# Patient Record
Sex: FEMALE | Race: WHITE | NOT HISPANIC OR LATINO | Employment: FULL TIME | URBAN - METROPOLITAN AREA
[De-identification: names, ages, dates, MRNs, and addresses within clinical notes are randomized per-mention and may not be internally consistent; named-entity substitution may affect disease eponyms.]

---

## 2019-07-31 ENCOUNTER — OFFICE VISIT (OUTPATIENT)
Dept: ENDOCRINOLOGY | Facility: CLINIC | Age: 39
End: 2019-07-31

## 2019-07-31 VITALS
SYSTOLIC BLOOD PRESSURE: 98 MMHG | WEIGHT: 156.5 LBS | OXYGEN SATURATION: 99 % | HEART RATE: 57 BPM | BODY MASS INDEX: 25.15 KG/M2 | DIASTOLIC BLOOD PRESSURE: 68 MMHG | HEIGHT: 66 IN

## 2019-07-31 DIAGNOSIS — E55.9 VITAMIN D DEFICIENCY: ICD-10-CM

## 2019-07-31 DIAGNOSIS — E53.8 B12 DEFICIENCY: ICD-10-CM

## 2019-07-31 DIAGNOSIS — E06.3 HASHIMOTO'S DISEASE: Primary | ICD-10-CM

## 2019-07-31 PROCEDURE — 99243 OFF/OP CNSLTJ NEW/EST LOW 30: CPT | Performed by: INTERNAL MEDICINE

## 2019-07-31 RX ORDER — ERGOCALCIFEROL 1.25 MG/1
CAPSULE ORAL
COMMUNITY
Start: 2019-07-30

## 2019-07-31 NOTE — PROGRESS NOTES
"Abnormal THyroid Labs (Consult for Nayely Wilkins)    Rene HILLS is a 39 y.o. female. she is being seen for consultation today at the request of  Nayely Wilkins MD for evaluation of thyroiditis.  She presented originally a year ago c/o tachycardia with exercise. Her levels of the thyroid were normal, but antibodies were positive.   Labs from 5/2019 reviewed and showed normal TFT with TSH of 2.9, positive TPO and anti TG ab. CRP and RF were normal. B12 vitamin was low at 260.      C/o fatigue. No symptoms otherwise. The heart rate 180-190  Coffee intake - 1 cup a day. Occasional caffeinated drinks.     Vit D deficiency is treated with 50 000 IU  weekly for 1 month.     History of Present Illness    Review of Systems  Review of Systems   Constitutional: Positive for fatigue.   All other systems reviewed and are negative.    Current medications:  Current Outpatient Medications   Medication Sig Dispense Refill   • vitamin D (ERGOCALCIFEROL) 29960 units capsule capsule        No current facility-administered medications for this visit.        The following portions of the patient's history were reviewed and updated as appropriate: She  has no past medical history on file.  She  has no past surgical history on file.  Her family history includes Arthritis in her maternal grandmother and mother; Breast cancer in her mother; Colon cancer in her father; Hypertension in her mother; Obesity in her mother.  She  reports that she has never smoked. She has never used smokeless tobacco. She reports that she drinks alcohol. She reports that she does not use drugs.  She has No Known Allergies..        Objective      Vitals:    07/31/19 1144   BP: 98/68   Pulse: 57   SpO2: 99%   Weight: 71 kg (156 lb 8 oz)   Height: 167.6 cm (66\")   Body mass index is 25.26 kg/m².  Physical Exam   Constitutional: She is oriented to person, place, and time. She appears well-developed and well-nourished.   HENT:   Head: Normocephalic " and atraumatic.   Eyes: Conjunctivae are normal.   Neck: No thyromegaly present.   Cardiovascular: Normal rate, regular rhythm, normal heart sounds and intact distal pulses.   Pulmonary/Chest: Effort normal and breath sounds normal.   Musculoskeletal: She exhibits no edema.   Lymphadenopathy:     She has no cervical adenopathy.   Neurological: She is alert and oriented to person, place, and time.   Psychiatric: She has a normal mood and affect. Thought content normal.       LABS AND IMAGING  No visits with results within 1 Month(s) from this visit.   Latest known visit with results is:   No results found for any previous visit.       1. Hashimoto's disease    2. B12 deficiency    3. Vitamin D deficiency        Assessment/Plan      Problem List Items Addressed This Visit        Digestive    B12 deficiency    Vitamin D deficiency       Endocrine    Hashimoto's disease - Primary    Relevant Orders    T4, Free    TSH    Vitamin D 25 Hydroxy            PLAN  -Positive thyroid antibodies with normal thyroid function - Hashimoto thyroiditis diagnosis discussed. No indications for therapy at this time. I have reviewed in details symptoms of hypothyroidism and recommend monitoring  Thyroid function on a yearly basis.  -TFT obtained today.      -Add B12 Vitamin supplement. Levels were low on the last labs and it may contribute to fatigue.     -I have also ordered Vit D levels and will give the recommendations on replacement after review of labs.     - diagnosis was discussed with the patient, and her questions were answered.     - her records reviewed and summarized in the HPI portion of the note.       Return if symptoms worsen or fail to improve.

## 2019-08-01 LAB
25(OH)D3+25(OH)D2 SERPL-MCNC: 36.5 NG/ML (ref 30–100)
T4 FREE SERPL-MCNC: 1.22 NG/DL (ref 0.93–1.7)
TSH SERPL DL<=0.005 MIU/L-ACNC: 2.41 UIU/ML (ref 0.45–4.5)